# Patient Record
Sex: FEMALE | URBAN - METROPOLITAN AREA
[De-identification: names, ages, dates, MRNs, and addresses within clinical notes are randomized per-mention and may not be internally consistent; named-entity substitution may affect disease eponyms.]

---

## 2020-01-24 ENCOUNTER — TELEPHONE (OUTPATIENT)
Dept: NEUROLOGY | Age: 85
End: 2020-01-24

## 2020-01-24 DIAGNOSIS — F41.8 DEPRESSION WITH ANXIETY: Primary | ICD-10-CM

## 2020-01-24 RX ORDER — AMITRIPTYLINE HYDROCHLORIDE 10 MG/1
10 TABLET, FILM COATED ORAL 2 TIMES DAILY
Qty: 90 TABLET | Refills: 3 | Status: SHIPPED | OUTPATIENT
Start: 2020-01-24

## 2020-02-20 ENCOUNTER — APPOINTMENT (OUTPATIENT)
Dept: GENERAL RADIOLOGY | Facility: HOSPITAL | Age: 85
DRG: 811 | End: 2020-02-20
Attending: EMERGENCY MEDICINE
Payer: MEDICARE

## 2020-02-20 ENCOUNTER — HOSPITAL ENCOUNTER (INPATIENT)
Facility: HOSPITAL | Age: 85
LOS: 4 days | Discharge: NURSING, REHAB OR LONG-TERM CARE | DRG: 811 | End: 2020-02-26
Attending: EMERGENCY MEDICINE | Admitting: INTERNAL MEDICINE
Payer: MEDICARE

## 2020-02-20 DIAGNOSIS — N18.9 CHRONIC KIDNEY DISEASE, UNSPECIFIED CKD STAGE: ICD-10-CM

## 2020-02-20 DIAGNOSIS — D64.9 ANEMIA, UNSPECIFIED TYPE: ICD-10-CM

## 2020-02-20 DIAGNOSIS — I50.9 ACUTE ON CHRONIC CONGESTIVE HEART FAILURE, UNSPECIFIED HEART FAILURE TYPE (HCC): Primary | ICD-10-CM

## 2020-02-20 DIAGNOSIS — I48.20 ATRIAL FIBRILLATION, CHRONIC (HCC): ICD-10-CM

## 2020-02-20 DIAGNOSIS — D50.0 IRON DEFICIENCY ANEMIA SECONDARY TO BLOOD LOSS (CHRONIC): ICD-10-CM

## 2020-02-20 LAB
ALBUMIN SERPL-MCNC: 2.4 G/DL (ref 3.4–5)
ALBUMIN/GLOB SERPL: 0.7 {RATIO} (ref 1–2)
ALP LIVER SERPL-CCNC: 62 U/L (ref 55–142)
ALT SERPL-CCNC: 31 U/L (ref 13–56)
ANION GAP SERPL CALC-SCNC: 5 MMOL/L (ref 0–18)
ANTIBODY SCREEN: NEGATIVE
AST SERPL-CCNC: 48 U/L (ref 15–37)
BASOPHILS # BLD AUTO: 0.02 X10(3) UL (ref 0–0.2)
BASOPHILS NFR BLD AUTO: 0.2 %
BILIRUB SERPL-MCNC: 0.7 MG/DL (ref 0.1–2)
BILIRUB UR QL STRIP.AUTO: NEGATIVE
BUN BLD-MCNC: 51 MG/DL (ref 7–18)
BUN/CREAT SERPL: 26.3 (ref 10–20)
CALCIUM BLD-MCNC: 7.9 MG/DL (ref 8.5–10.1)
CHLORIDE SERPL-SCNC: 105 MMOL/L (ref 98–112)
CO2 SERPL-SCNC: 25 MMOL/L (ref 21–32)
COLOR UR AUTO: YELLOW
CREAT BLD-MCNC: 1.94 MG/DL (ref 0.55–1.02)
DEPRECATED RDW RBC AUTO: 67.3 FL (ref 35.1–46.3)
EOSINOPHIL # BLD AUTO: 0.12 X10(3) UL (ref 0–0.7)
EOSINOPHIL NFR BLD AUTO: 1.4 %
ERYTHROCYTE [DISTWIDTH] IN BLOOD BY AUTOMATED COUNT: 17.7 % (ref 11–15)
GLOBULIN PLAS-MCNC: 3.3 G/DL (ref 2.8–4.4)
GLUCOSE BLD-MCNC: 175 MG/DL (ref 70–99)
GLUCOSE UR STRIP.AUTO-MCNC: NEGATIVE MG/DL
HCT VFR BLD AUTO: 25 % (ref 35–48)
HGB BLD-MCNC: 7.7 G/DL (ref 12–16)
IMM GRANULOCYTES # BLD AUTO: 0.1 X10(3) UL (ref 0–1)
IMM GRANULOCYTES NFR BLD: 1.2 %
LACTATE SERPL-SCNC: 1.1 MMOL/L (ref 0.4–2)
LEUKOCYTE ESTERASE UR QL STRIP.AUTO: NEGATIVE
LYMPHOCYTES # BLD AUTO: 0.63 X10(3) UL (ref 1–4)
LYMPHOCYTES NFR BLD AUTO: 7.6 %
M PROTEIN MFR SERPL ELPH: 5.7 G/DL (ref 6.4–8.2)
MCH RBC QN AUTO: 32.9 PG (ref 26–34)
MCHC RBC AUTO-ENTMCNC: 30.8 G/DL (ref 31–37)
MCV RBC AUTO: 106.8 FL (ref 80–100)
MONOCYTES # BLD AUTO: 0.73 X10(3) UL (ref 0.1–1)
MONOCYTES NFR BLD AUTO: 8.8 %
NEUTROPHILS # BLD AUTO: 6.68 X10 (3) UL (ref 1.5–7.7)
NEUTROPHILS # BLD AUTO: 6.68 X10(3) UL (ref 1.5–7.7)
NEUTROPHILS NFR BLD AUTO: 80.8 %
NITRITE UR QL STRIP.AUTO: NEGATIVE
NT-PROBNP SERPL-MCNC: 7421 PG/ML (ref ?–450)
OSMOLALITY SERPL CALC.SUM OF ELEC: 298 MOSM/KG (ref 275–295)
PH UR STRIP.AUTO: 8 [PH] (ref 4.5–8)
PLATELET # BLD AUTO: 239 10(3)UL (ref 150–450)
POTASSIUM SERPL-SCNC: 4.9 MMOL/L (ref 3.5–5.1)
PROT UR STRIP.AUTO-MCNC: NEGATIVE MG/DL
RBC # BLD AUTO: 2.34 X10(6)UL (ref 3.8–5.3)
RBC UR QL AUTO: NEGATIVE
RH BLOOD TYPE: POSITIVE
SODIUM SERPL-SCNC: 135 MMOL/L (ref 136–145)
SP GR UR STRIP.AUTO: 1.01 (ref 1–1.03)
UROBILINOGEN UR STRIP.AUTO-MCNC: <2 MG/DL
WBC # BLD AUTO: 8.3 X10(3) UL (ref 4–11)

## 2020-02-20 PROCEDURE — 71045 X-RAY EXAM CHEST 1 VIEW: CPT | Performed by: EMERGENCY MEDICINE

## 2020-02-20 PROCEDURE — 99222 1ST HOSP IP/OBS MODERATE 55: CPT | Performed by: INTERNAL MEDICINE

## 2020-02-20 RX ORDER — SIMVASTATIN 40 MG
40 TABLET ORAL NIGHTLY
Status: ON HOLD | COMMUNITY
End: 2020-02-24

## 2020-02-20 RX ORDER — OMEPRAZOLE 20 MG/1
40 CAPSULE, DELAYED RELEASE ORAL
COMMUNITY

## 2020-02-20 RX ORDER — FENOFIBRATE 67 MG/1
67 CAPSULE ORAL
Status: DISCONTINUED | OUTPATIENT
Start: 2020-02-21 | End: 2020-02-26

## 2020-02-20 RX ORDER — AMITRIPTYLINE HYDROCHLORIDE 10 MG/1
20 TABLET, FILM COATED ORAL NIGHTLY
Status: ON HOLD | COMMUNITY
End: 2020-02-21

## 2020-02-20 RX ORDER — DILTIAZEM HYDROCHLORIDE 240 MG/1
240 CAPSULE, EXTENDED RELEASE ORAL DAILY
COMMUNITY

## 2020-02-20 RX ORDER — FUROSEMIDE 10 MG/ML
20 INJECTION INTRAMUSCULAR; INTRAVENOUS ONCE
Status: COMPLETED | OUTPATIENT
Start: 2020-02-20 | End: 2020-02-20

## 2020-02-20 RX ORDER — CRANBERRY FRUIT EXTRACT 200 MG
1 CAPSULE ORAL DAILY
COMMUNITY

## 2020-02-20 RX ORDER — ATORVASTATIN CALCIUM 20 MG/1
20 TABLET, FILM COATED ORAL NIGHTLY
Status: DISCONTINUED | OUTPATIENT
Start: 2020-02-20 | End: 2020-02-26

## 2020-02-20 RX ORDER — DILTIAZEM HYDROCHLORIDE 120 MG/1
240 CAPSULE, EXTENDED RELEASE ORAL DAILY
Status: DISCONTINUED | OUTPATIENT
Start: 2020-02-20 | End: 2020-02-26

## 2020-02-20 RX ORDER — SODIUM CHLORIDE 9 MG/ML
INJECTION, SOLUTION INTRAVENOUS CONTINUOUS
Status: DISCONTINUED | OUTPATIENT
Start: 2020-02-20 | End: 2020-02-21

## 2020-02-20 RX ORDER — LEVOTHYROXINE SODIUM 0.1 MG/1
100 TABLET ORAL
COMMUNITY

## 2020-02-20 RX ORDER — FENOFIBRATE 145 MG/1
160 TABLET, COATED ORAL DAILY
COMMUNITY

## 2020-02-20 RX ORDER — FLECAINIDE ACETATE 100 MG/1
100 TABLET ORAL EVERY 12 HOURS
Status: ON HOLD | COMMUNITY
End: 2020-02-24

## 2020-02-20 RX ORDER — MAGNESIUM OXIDE 400 MG (241.3 MG MAGNESIUM) TABLET
400 TABLET DAILY
COMMUNITY

## 2020-02-20 RX ORDER — GABAPENTIN 300 MG/1
300 CAPSULE ORAL 3 TIMES DAILY
COMMUNITY

## 2020-02-20 RX ORDER — MULTIVITAMIN
1 TABLET ORAL DAILY
COMMUNITY

## 2020-02-20 RX ORDER — GLIPIZIDE 10 MG/1
10 TABLET ORAL
Status: ON HOLD | COMMUNITY
End: 2020-02-21

## 2020-02-21 LAB
ALBUMIN SERPL-MCNC: 2.1 G/DL (ref 3.4–5)
ALBUMIN/GLOB SERPL: 0.7 {RATIO} (ref 1–2)
ALP LIVER SERPL-CCNC: 55 U/L (ref 55–142)
ALT SERPL-CCNC: 27 U/L (ref 13–56)
ANION GAP SERPL CALC-SCNC: 5 MMOL/L (ref 0–18)
AST SERPL-CCNC: 44 U/L (ref 15–37)
ATRIAL RATE: 104 BPM
BASOPHILS # BLD AUTO: 0.05 X10(3) UL (ref 0–0.2)
BASOPHILS NFR BLD AUTO: 0.8 %
BILIRUB SERPL-MCNC: 0.9 MG/DL (ref 0.1–2)
BUN BLD-MCNC: 48 MG/DL (ref 7–18)
BUN/CREAT SERPL: 29.6 (ref 10–20)
CALCIUM BLD-MCNC: 8.4 MG/DL (ref 8.5–10.1)
CHLORIDE SERPL-SCNC: 109 MMOL/L (ref 98–112)
CO2 SERPL-SCNC: 25 MMOL/L (ref 21–32)
CREAT BLD-MCNC: 1.62 MG/DL (ref 0.55–1.02)
DEPRECATED HBV CORE AB SER IA-ACNC: 778.8 NG/ML (ref 18–340)
DEPRECATED RDW RBC AUTO: 72.4 FL (ref 35.1–46.3)
EOSINOPHIL # BLD AUTO: 0.13 X10(3) UL (ref 0–0.7)
EOSINOPHIL NFR BLD AUTO: 2 %
ERYTHROCYTE [DISTWIDTH] IN BLOOD BY AUTOMATED COUNT: 18.2 % (ref 11–15)
EST. AVERAGE GLUCOSE BLD GHB EST-MCNC: 134 MG/DL (ref 68–126)
FOLATE SERPL-MCNC: 9.4 NG/ML (ref 8.7–?)
GLOBULIN PLAS-MCNC: 3.1 G/DL (ref 2.8–4.4)
GLUCOSE BLD-MCNC: 118 MG/DL (ref 70–99)
GLUCOSE BLD-MCNC: 138 MG/DL (ref 70–99)
GLUCOSE BLD-MCNC: 174 MG/DL (ref 70–99)
GLUCOSE BLD-MCNC: 177 MG/DL (ref 70–99)
GLUCOSE BLD-MCNC: 187 MG/DL (ref 70–99)
HAV IGM SER QL: 2 MG/DL (ref 1.6–2.6)
HBA1C MFR BLD HPLC: 6.3 % (ref ?–5.7)
HCT VFR BLD AUTO: 26.1 % (ref 35–48)
HGB BLD-MCNC: 7.9 G/DL (ref 12–16)
HGB RETIC QN AUTO: 35.8 PG (ref 28.2–36.6)
IMM GRANULOCYTES # BLD AUTO: 0.08 X10(3) UL (ref 0–1)
IMM GRANULOCYTES NFR BLD: 1.2 %
IMM RETICS NFR: 0.38 RATIO (ref 0.1–0.3)
IRON SATURATION: 11 % (ref 15–50)
IRON SERPL-MCNC: 31 UG/DL (ref 50–170)
LYMPHOCYTES # BLD AUTO: 0.57 X10(3) UL (ref 1–4)
LYMPHOCYTES NFR BLD AUTO: 8.7 %
M PROTEIN MFR SERPL ELPH: 5.2 G/DL (ref 6.4–8.2)
MCH RBC QN AUTO: 33.6 PG (ref 26–34)
MCHC RBC AUTO-ENTMCNC: 30.3 G/DL (ref 31–37)
MCV RBC AUTO: 111.1 FL (ref 80–100)
MONOCYTES # BLD AUTO: 0.72 X10(3) UL (ref 0.1–1)
MONOCYTES NFR BLD AUTO: 11 %
NEUTROPHILS # BLD AUTO: 5.01 X10 (3) UL (ref 1.5–7.7)
NEUTROPHILS # BLD AUTO: 5.01 X10(3) UL (ref 1.5–7.7)
NEUTROPHILS NFR BLD AUTO: 76.3 %
OSMOLALITY SERPL CALC.SUM OF ELEC: 302 MOSM/KG (ref 275–295)
PLATELET # BLD AUTO: 243 10(3)UL (ref 150–450)
POTASSIUM SERPL-SCNC: 4.7 MMOL/L (ref 3.5–5.1)
Q-T INTERVAL: 290 MS
QRS DURATION: 112 MS
QTC CALCULATION (BEZET): 372 MS
R AXIS: 44 DEGREES
RBC # BLD AUTO: 2.35 X10(6)UL (ref 3.8–5.3)
RETICS # AUTO: 123.2 X10(3) UL (ref 22.5–147.5)
RETICS/RBC NFR AUTO: 5.5 % (ref 0.5–2.5)
SODIUM SERPL-SCNC: 139 MMOL/L (ref 136–145)
T AXIS: 209 DEGREES
T4 FREE SERPL-MCNC: 1 NG/DL (ref 0.8–1.7)
TOTAL IRON BINDING CAPACITY: 285 UG/DL (ref 240–450)
TRANSFERRIN SERPL-MCNC: 191 MG/DL (ref 200–360)
TSI SER-ACNC: 6.8 MIU/ML (ref 0.36–3.74)
VENTRICULAR RATE: 99 BPM
VIT B12 SERPL-MCNC: 870 PG/ML (ref 193–986)
WBC # BLD AUTO: 6.6 X10(3) UL (ref 4–11)

## 2020-02-21 PROCEDURE — 99222 1ST HOSP IP/OBS MODERATE 55: CPT | Performed by: INTERNAL MEDICINE

## 2020-02-21 PROCEDURE — 99232 SBSQ HOSP IP/OBS MODERATE 35: CPT | Performed by: INTERNAL MEDICINE

## 2020-02-21 RX ORDER — ACETAMINOPHEN 325 MG/1
650 TABLET ORAL EVERY 6 HOURS PRN
Status: DISCONTINUED | OUTPATIENT
Start: 2020-02-21 | End: 2020-02-26

## 2020-02-21 RX ORDER — FOLIC ACID 1 MG/1
1 TABLET ORAL DAILY
Status: DISCONTINUED | OUTPATIENT
Start: 2020-02-21 | End: 2020-02-26

## 2020-02-21 RX ORDER — DEXTROSE MONOHYDRATE 25 G/50ML
50 INJECTION, SOLUTION INTRAVENOUS
Status: DISCONTINUED | OUTPATIENT
Start: 2020-02-21 | End: 2020-02-26

## 2020-02-21 RX ORDER — DOXEPIN HYDROCHLORIDE 50 MG/1
1 CAPSULE ORAL DAILY
Status: DISCONTINUED | OUTPATIENT
Start: 2020-02-21 | End: 2020-02-26

## 2020-02-21 RX ORDER — ONDANSETRON 2 MG/ML
4 INJECTION INTRAMUSCULAR; INTRAVENOUS EVERY 6 HOURS PRN
Status: DISCONTINUED | OUTPATIENT
Start: 2020-02-21 | End: 2020-02-26

## 2020-02-21 RX ORDER — HYDRALAZINE HYDROCHLORIDE 20 MG/ML
10 INJECTION INTRAMUSCULAR; INTRAVENOUS EVERY 6 HOURS PRN
Status: DISCONTINUED | OUTPATIENT
Start: 2020-02-21 | End: 2020-02-26

## 2020-02-21 RX ORDER — PANTOPRAZOLE SODIUM 40 MG/1
40 TABLET, DELAYED RELEASE ORAL
Status: DISCONTINUED | OUTPATIENT
Start: 2020-02-21 | End: 2020-02-26

## 2020-02-21 RX ORDER — GABAPENTIN 300 MG/1
300 CAPSULE ORAL 3 TIMES DAILY
Status: DISCONTINUED | OUTPATIENT
Start: 2020-02-21 | End: 2020-02-26

## 2020-02-21 RX ORDER — LEVOTHYROXINE SODIUM 0.1 MG/1
100 TABLET ORAL
Status: DISCONTINUED | OUTPATIENT
Start: 2020-02-21 | End: 2020-02-26

## 2020-02-21 RX ORDER — AMITRIPTYLINE HYDROCHLORIDE 10 MG/1
20 TABLET, FILM COATED ORAL NIGHTLY
Status: DISCONTINUED | OUTPATIENT
Start: 2020-02-21 | End: 2020-02-23

## 2020-02-21 RX ORDER — DILTIAZEM HYDROCHLORIDE 5 MG/ML
10 INJECTION INTRAVENOUS EVERY 2 HOUR PRN
Status: DISCONTINUED | OUTPATIENT
Start: 2020-02-21 | End: 2020-02-26

## 2020-02-21 RX ORDER — DIPHENHYDRAMINE HYDROCHLORIDE 50 MG/ML
25 INJECTION INTRAMUSCULAR; INTRAVENOUS EVERY 6 HOURS PRN
Status: DISCONTINUED | OUTPATIENT
Start: 2020-02-21 | End: 2020-02-26

## 2020-02-21 RX ORDER — GLIPIZIDE 5 MG/1
10 TABLET ORAL
Status: DISCONTINUED | OUTPATIENT
Start: 2020-02-21 | End: 2020-02-21

## 2020-02-21 NOTE — PROGRESS NOTES
· [facility]      Subjective:  Awake, alert no dyspnea.     Objective:  128/60  143/77  AFib 100-120s  Afebrile  UO -2825 overnight  BUN/cr 48/1.62  Hgb 7.9    Neuro: awake/alert  HEENT:  Cardiac: S1 S2 irregular tachy  Lungs:  Few crackles rig

## 2020-02-21 NOTE — CONSULTS
[facility]  Cardiology Consultation    [facility] Patient Status:  Inpatient    1931 MRN [id number]   [facility] 2NE-A Attending Shama Núñez MD   Hosp Day # 0 PCP Ok Cook MD     Reason for Consultation:  Severe anemia on 2/20/2020 2311  Last data filed at 2/20/2020 2020  Gross per 24 hour   Intake —   Output 800 ml   Net -800 ml     Wt Readings from Last 3 Encounters:  02/20/20 : 212 lb 4.9 oz      Physical Exam:   General: Alert and oriented x 3. No apparent distress.  No

## 2020-02-21 NOTE — CM/SW NOTE
Plan of care reviewed for care coordination. Pt is from [address] [facility] in [facility] presented to ER with abnormal labs. Has hx of CAF, Anemia, CKD, and DM per daughter, Maday Osorio, the [address] [facility] who I spoke to confirm.  Daughter is getting discharged f

## 2020-02-21 NOTE — CONSULTS
[facility]  Report of Consultation    Fabio Dozier Patient Status:  Observation    1931 MRN [id number]   [facility] 2NE-A Attending Walter Reyna MD   Hosp Day # 1 PCP Jewell Castro MD     Reason for Consultation:  ANTONIO/afib with RV Subcutaneous, TID CC and HS  •  dilTIAZem HCl (CARDIZEM) injection 10 mg, 10 mg, Intravenous, Q2H PRN  •  metoprolol Tartrate (LOPRESSOR) tab 25 mg, 25 mg, Oral, 2x Daily(Beta Blocker)  •  acetaminophen (TYLENOL) tab 650 mg, 650 mg, Oral, Q6H PRN  •  diphe 6.6 02/21/2020    HGB 7.9 02/21/2020    HCT 26.1 02/21/2020    .0 02/21/2020    CREATSERUM 1.62 02/21/2020    BUN 48 02/21/2020     02/21/2020    K 4.7 02/21/2020     02/21/2020    CO2 25.0 02/21/2020     02/21/2020    CA 8.4 02/2

## 2020-02-21 NOTE — CM/SW NOTE
Patient failed inpatient criteria. Second level of review completed and supports observation. UR committee in agreement. Discussed with Dr. Finesse Pascual  who approves observation status. MOON given to the patient and order written.

## 2020-02-21 NOTE — PLAN OF CARE
Pt received at 0730 resting in bed. A&Ox2-3, knows name, month and year, and hospital but not which one. 3+ pitting edema to BLE. Right arm very swollen, possibly from recent PICC? Which has been removed prior to admission.  Recent venous doppler shows no D effectiveness of vasoactive medications to optimize hemodynamic stability  - Monitor arterial and/or venous puncture sites for bleeding and/or hematoma  - Assess quality of pulses, skin color and temperature  - Assess for signs of decreased coronary artery management of diabetes  Outcome: Progressing  Goal: Electrolytes maintained within normal limits  Description  INTERVENTIONS:  - Monitor labs and rhythm and assess patient for signs and symptoms of electrolyte imbalances  - Administer electrolyte replaceme

## 2020-02-21 NOTE — DIETARY NOTE
[facility]   CLINICAL NUTRITION    Demetria Depa     Admitting diagnosis:  Atrial fibrillation, chronic (HCC) [I48.20]  Anemia, unspecified type [D64.9]  Chronic kidney disease, unspecified CKD stage [N18.9]  Acute on chronic congestive heart failure, unsp

## 2020-02-21 NOTE — PROGRESS NOTES
Tried to wean to room air while sleeping. 02 sat drop to 87%. 2l/nc applied and sat up to 95%. Denies any sob. Cont. to monitor pt.

## 2020-02-21 NOTE — SLP NOTE
ADULT SWALLOWING EVALUATION    ASSESSMENT    ASSESSMENT/OVERALL IMPRESSION:  Patient seen for swallowing evaluation per protocol.   She has a history of dysphagia evidenced by diet consistency order of puree and thin liquids with 1:1 assistance with feeding Electrolyte and fluid disorder    • Encephalopathy    • Essential hypertension    • Hyperlipidemia    • Intervertebral disc disease    • Malignant melanoma ([facility] [address]     SKIN    • Osteoporosis    • Scoliosis    • Weakness        Prior Living Situation: Skilled (Please note: Silent aspiration cannot be evaluated clinically.  Videofluoroscopic Swallow Study is required to rule-out silent aspiration.)    Esophageal Phase of Swallow: No complaints consistent with possible esophageal involvement          FOLLOW UP

## 2020-02-21 NOTE — PROGRESS NOTES
NURSING ADMISSION NOTE      Patient admitted via Cart  Oriented to room. Safety precautions initiated. Bed in low position. Call light in reach. Admission navigator questions including medication reconciliation completed.

## 2020-02-21 NOTE — PHYSICAL THERAPY NOTE
PHYSICAL THERAPY EVALUATION - INPATIENT     Room Number: 6840/1915-W  Evaluation Date: 2/21/2020  Type of Evaluation: Initial  Physician Order: PT Eval and Treat    Presenting Problem: acute on chronic congestive heart failure  Reason for Therapy:  Musa Cohen COGNITION  · Arousal/Alertness:  delayed responses to stimuli  · Orientation Level:  oriented to place, oriented to person and disoriented to time  · Following Commands:  follows one step commands with increased time  · Safety Judgement:  decreased received, chart reviewed and RN approved PT session. Pt lying in bed and agreed to PT. Pt with Max A for supine<>sit to EOB. Pt with difficulty sitting EOB and demonstrates right trunk lean and weakness.  Pt cued for balance and required Mod A for sitting E Sub-acute rehabilitation    PLAN  PT Treatment Plan: Bed mobility; Body mechanics; Endurance; Energy conservation;Patient education; Family education;Strengthening;Transfer training;Balance training  Rehab Potential : Fair  Frequency (Obs): 5x/week  Number of

## 2020-02-21 NOTE — ED NOTES
PER LIANA RN AT [address] [place] [address] PT WAS HOSPITALIZED IN January IN THE ICU FOR A FALL, DURING ADMISSION PT WAS DIAGNOSED WITH A FIB AS WELL AS PACEMAKER PLACEMENT.  PRIOR TO ADMISSION TO [place] PT WAS LIVING AT [address] [place] [address]

## 2020-02-21 NOTE — ED INITIAL ASSESSMENT (HPI)
Pt to ed from [facility] care for c/o low hemaglobin.  PT offers no complaints

## 2020-02-21 NOTE — CARDIAC REHAB
Received referral for heart failure education. Deferred r/t to patient's current cognitive status, and no family present.

## 2020-02-21 NOTE — CONSULTS
[facility]                       Gastroenterology [address] [facility]    Demetria Depa Patient Status:  Observation    1931 MRN [id number]   [facility] 2NE-A Attending Maris Dupont MD   [facility] Day # mg, Oral, QAM AC  glucose (DEX4) oral liquid 15 g, 15 g, Oral, Q15 Min PRN    Or  Glucose-Vitamin C (DEX-4) chewable tab 4 tablet, 4 tablet, Oral, Q15 Min PRN    Or  dextrose 50 % injection 50 mL, 50 mL, Intravenous, Q15 Min PRN    Or  glucose (DEX4) oral s/p PPM (Eliquis)            Respiratory: No shortness of breath, asthma, copd, recurrent pneumonia            Hematologic: The patient reports no easy bruising, frequent gum bleeding or nose bleeding; + chronic anemia            Dermatologic: The patient  02/21/2020    CA 8.4 02/21/2020    ALB 2.1 02/21/2020    ALKPHO 55 02/21/2020    BILT 0.9 02/21/2020    AST 44 02/21/2020    ALT 27 02/21/2020    MG 2.0 02/21/2020     Recent Labs   Lab 02/20/20  1842 02/21/20  0627   * 118*   BUN 51* 48* macrocytic anemia: no overt GI bleeding, no GI symptoms. Will await iron studies and monitor for overt bleeding--had recent admission with Hgb 6 d/t large hematoma at pacer implant site ([facility] admit). 2. AF s/p PPM on Eliquis  3. CKD  4. DM  5.

## 2020-02-21 NOTE — H&P
[facility]    History & Physical    [facility] Patient Status:  Inpatient    1931 MRN [id number]   [facility] [place] 2NE-A Attending Maura Sanders MD   Hosp Day # 1 PCP Mabel Galvan MD     Date:  2020  Date of Admission:   file    Allergies/Medications: Allergies:   Erythromycin            UNKNOWN  omeprazole 20 MG Oral Capsule Delayed Release, Take 40 mg by mouth every morning before breakfast.  simvastatin 40 MG Oral Tab, Take 40 mg by mouth nightly.   Levothyroxine Sodiu breath sound at bases bilaterally, respirations unlabored       Heart:    Regular rate and rhythm, S1 and S2 normal,    Abdomen:     Soft, non-tender, bowel sounds active all four quadrants,                Extremities:    no cyanosis, icterus        Neurol transcribed by Technologist)  Pt to ed from [facility] care for c/o low hemaglobin. PT offers no  complaints     FINDINGS:  Left-sided pacemaker. Cardiomegaly. Interstitial and alveolar opacities bilaterally.   Bilateral pleural effusions, left gre unspecified CKD stage  Because of acute on chronic kidney failure, diuretics on hold, renal follow-up    IDDM with complication–hold glipizide due to renal failure, start Levemir 8 units and NovoLog sliding scale    GERD–pantoprazole    DVT prophylaxis–Tran

## 2020-02-21 NOTE — OCCUPATIONAL THERAPY NOTE
OCCUPATIONAL THERAPY EVALUATION - INPATIENT     Room Number: 1388/2376-Q  Evaluation Date: 2/21/2020  Type of Evaluation: Initial  Presenting Problem: Anemia    Physician Order: IP Consult to Occupational Therapy  Reason for Therapy: ADL/IADL Dysfunction a Function: Pt reports assist for LB dressing and bathing, MOD I for UB dressing, assist of one for t/f. Reports ambulates with RW, gets meals brought to her room. SUBJECTIVE   \"Oh I was sleeping. \"    Patient self-stated goal is not stated     OBJECTIVE Scale): 30.6  CMS Modifier (G-Code): CL    FUNCTIONAL TRANSFER ASSESSMENT  Supine to Sit : Maximum assistance  Sit to Stand: Maximum assistance(max A x 2)    Skilled Therapy Provided: Pt received supine in bed.  Pt oreinted to self, disoriented to place and occupational profile, problem-focused assessments, limited treatment options    Overall Complexity LOW     OT Discharge Recommendations: Home with home health PT/OT(pt is from assisted living)  OT Device Recommendations: TBD    PLAN  OT Treatment Plan: Brigid Rosenthal

## 2020-02-21 NOTE — ED PROVIDER NOTES
Patient Seen in: [facility] Emergency Department      History   Patient presents with:  Abnormal Labs    Stated Complaint: Low Hemoglobin    HPI    31-year-old female with history of chronic atrial fibrillation, chronic kidney disease, anemia, prese scleral icterus. Mucous membranes are moist, oropharynx is clear, uvula midline. Scalp is atraumatic. NECK: Neck is supple, there is no nuchal rigidity. HEART: Regular rate and rhythm, no murmurs. LUNGS: Coarse breath sounds bilaterally. , no wheezin (*)     MCHC 30.8 (*)     RDW 17.7 (*)     RDW-SD 67.3 (*)     Lymphocyte Absolute 0.63 (*)     All other components within normal limits   LACTIC ACID, PLASMA - Normal   CBC WITH DIFFERENTIAL WITH PLATELET    Narrative:      The following orders were creat physician on call for Kely Rosa. Patient admitted for further evaluation treatment.   Admission disposition: 2/20/2020  8:24 PM                   Disposition and Plan     Clinical Impression:  Acute on chronic congestive heart failure, unspecified heart venita

## 2020-02-21 NOTE — PLAN OF CARE
Received pt from er. Admitted d/t abnormal lab- hgb 7.7. alert. Pleasantly confused. Oriented to person. No family at bedside. Pt came to er w/ a vu cath that was inserted @ the [facility]. Generalized edema- RUE, BLE. Mult anitha noted. Denies cp, sob.  Poc dis

## 2020-02-21 NOTE — CONSULTS
THE [facility] OF [facility] Report of Consultation      Patient Name: Zully Uriostegui   YOB: 1931  Medical Record Number: [id number]  Consulting Physician: Mae Johnson. Christiano James M.D.    Referring Physician: Walter Reyna MD    Date of Consultatio Medications  Amitriptyline HCl (ELAVIL) tab 20 mg, 20 mg, Oral, Nightly  gabapentin (NEURONTIN) cap 300 mg, 300 mg, Oral, TID  Levothyroxine Sodium (SYNTHROID) tab 100 mcg, 100 mcg, Oral, Before breakfast  multivitamin (ADULT) tab 1 tablet, 1 tablet, Oral, or enlarged lymph nodes. Respiratory No dyspnea at rest.  Cardiovascular No anginal chest pain, palpitations, edema, orthopnea. Gastrointestinal No melena, hematochezia. Genitourinary No hematuria, no vaginal bleeding.   Musculoskeletal Right arm swellin 295 mOsm/kg    GFR, Non- 23 (L) >=60    GFR, -American 26 (L) >=60    AST 48 (H) 15 - 37 U/L    ALT 31 13 - 56 U/L    Alkaline Phosphatase 62 55 - 142 U/L    Bilirubin, Total 0.7 0.1 - 2.0 mg/dL    Total Protein 5.7 (L) 6.4 - 8.2 g/d Result Value Ref Range    RBC Morphology See morphology below (A) Normal, Slide reviewed, see previous RBC morphology.     Platelet Morphology See morphology below (A) Normal    Microcytosis 1+      Macrocytosis 2+ (A)      Giant platelets Few (A)     LAC (L) 6.4 - 8.2 g/dL    Albumin 2.1 (L) 3.4 - 5.0 g/dL    Globulin  3.1 2.8 - 4.4 g/dL    A/G Ratio 0.7 (L) 1.0 - 2.0   MAGNESIUM    Collection Time: 02/21/20  6:27 AM   Result Value Ref Range    Magnesium 2.0 1.6 - 2.6 mg/dL   HEMOGLOBIN A1C    Collection T pneumothorax. Atheromatous calcifications of the   aorta. Thin linear radiopaque report densities identified within both lungs      =====  CONCLUSION:    1. Cardiomegaly with interstitial alveolar opacities bilaterally likely representing edema.   [address] [facility]

## 2020-02-22 LAB
ALBUMIN SERPL-MCNC: 2.3 G/DL (ref 3.4–5)
ALBUMIN/GLOB SERPL: 0.8 {RATIO} (ref 1–2)
ALP LIVER SERPL-CCNC: 57 U/L (ref 55–142)
ALT SERPL-CCNC: 23 U/L (ref 13–56)
ANION GAP SERPL CALC-SCNC: 5 MMOL/L (ref 0–18)
AST SERPL-CCNC: 39 U/L (ref 15–37)
BASOPHILS # BLD AUTO: 0.04 X10(3) UL (ref 0–0.2)
BASOPHILS NFR BLD AUTO: 0.4 %
BILIRUB SERPL-MCNC: 1.1 MG/DL (ref 0.1–2)
BUN BLD-MCNC: 40 MG/DL (ref 7–18)
BUN/CREAT SERPL: 27.6 (ref 10–20)
CALCIUM BLD-MCNC: 8.6 MG/DL (ref 8.5–10.1)
CHLORIDE SERPL-SCNC: 109 MMOL/L (ref 98–112)
CO2 SERPL-SCNC: 26 MMOL/L (ref 21–32)
CREAT BLD-MCNC: 1.45 MG/DL (ref 0.55–1.02)
DEPRECATED RDW RBC AUTO: 74.4 FL (ref 35.1–46.3)
EOSINOPHIL # BLD AUTO: 0.17 X10(3) UL (ref 0–0.7)
EOSINOPHIL NFR BLD AUTO: 1.9 %
ERYTHROCYTE [DISTWIDTH] IN BLOOD BY AUTOMATED COUNT: 18.7 % (ref 11–15)
GLOBULIN PLAS-MCNC: 3 G/DL (ref 2.8–4.4)
GLUCOSE BLD-MCNC: 128 MG/DL (ref 70–99)
GLUCOSE BLD-MCNC: 148 MG/DL (ref 70–99)
GLUCOSE BLD-MCNC: 162 MG/DL (ref 70–99)
GLUCOSE BLD-MCNC: 75 MG/DL (ref 70–99)
GLUCOSE BLD-MCNC: 75 MG/DL (ref 70–99)
HCT VFR BLD AUTO: 26.9 % (ref 35–48)
HGB BLD-MCNC: 8.3 G/DL (ref 12–16)
IMM GRANULOCYTES # BLD AUTO: 0.12 X10(3) UL (ref 0–1)
IMM GRANULOCYTES NFR BLD: 1.3 %
LYMPHOCYTES # BLD AUTO: 0.98 X10(3) UL (ref 1–4)
LYMPHOCYTES NFR BLD AUTO: 10.7 %
M PROTEIN MFR SERPL ELPH: 5.3 G/DL (ref 6.4–8.2)
MCH RBC QN AUTO: 33.7 PG (ref 26–34)
MCHC RBC AUTO-ENTMCNC: 30.9 G/DL (ref 31–37)
MCV RBC AUTO: 109.3 FL (ref 80–100)
MONOCYTES # BLD AUTO: 1.02 X10(3) UL (ref 0.1–1)
MONOCYTES NFR BLD AUTO: 11.1 %
NEUTROPHILS # BLD AUTO: 6.85 X10 (3) UL (ref 1.5–7.7)
NEUTROPHILS # BLD AUTO: 6.85 X10(3) UL (ref 1.5–7.7)
NEUTROPHILS NFR BLD AUTO: 74.6 %
OSMOLALITY SERPL CALC.SUM OF ELEC: 298 MOSM/KG (ref 275–295)
PLATELET # BLD AUTO: 292 10(3)UL (ref 150–450)
POTASSIUM SERPL-SCNC: 4.1 MMOL/L (ref 3.5–5.1)
RBC # BLD AUTO: 2.46 X10(6)UL (ref 3.8–5.3)
SODIUM SERPL-SCNC: 140 MMOL/L (ref 136–145)
WBC # BLD AUTO: 9.2 X10(3) UL (ref 4–11)

## 2020-02-22 PROCEDURE — 99232 SBSQ HOSP IP/OBS MODERATE 35: CPT | Performed by: INTERNAL MEDICINE

## 2020-02-22 RX ORDER — METOPROLOL TARTRATE 50 MG/1
50 TABLET, FILM COATED ORAL
Status: DISCONTINUED | OUTPATIENT
Start: 2020-02-22 | End: 2020-02-26

## 2020-02-22 NOTE — PROGRESS NOTES
[facility]  Progress Note    Sujit Sneed Patient Status:  Observation    1931 MRN [id number]   [facility] 2NE-A Attending Ernie Padilla MD   Hosp Day # 1 PCP Lencho Freeman MD         SUBJECTIVE:  Subjective:   Sujit Sneed is a(n) 80 y 4.1    109 109   CO2 25.0 25.0 26.0   BUN 51* 48* 40*   CREATSERUM 1.94* 1.62* 1.45*   CA 7.9* 8.4* 8.6   MG  --  2.0  --    * 118* 75       Recent Labs   Lab 02/20/20  1842 02/21/20  0627 02/22/20  0616   ALT 31 27 23   AST 48* 44* 39*   ALB mg Oral TID   • Levothyroxine Sodium  100 mcg Oral Before breakfast   • multivitamin  1 tablet Oral Daily   • Pantoprazole Sodium  40 mg Oral QAM AC   • Insulin Aspart Pen  1-5 Units Subcutaneous TID CC and HS   • insulin detemir  8 Units Subcutaneous [facility] disease, unspecified CKD stage  Because of acute on chronic kidney failure, diuretics on hold, renal follow-up     IDDM with complication–hold glipizide due to renal failure, start Levemir 8 units and NovoLog sliding scale     GERD–pantoprazole     DVT pro

## 2020-02-22 NOTE — PROGRESS NOTES
[facility]  Nephrology Progress Note    Brian Grace Patient Status:  Observation    1931 MRN [id number]   [facility] 2NE-A Attending Fernando Langley MD   Hosp Day # 1 PCP Juancho Khoury MD       SUBJECTIVE:  Up in chair, awake and al (LOPRESSOR) tab 50 mg, 50 mg, Oral, 2x Daily(Beta Blocker)  Amitriptyline HCl (ELAVIL) tab 20 mg, 20 mg, Oral, Nightly  gabapentin (NEURONTIN) cap 300 mg, 300 mg, Oral, TID  Levothyroxine Sodium (SYNTHROID) tab 100 mcg, 100 mcg, Oral, Before breakfast  mul approx 2 grams. W/u ongoing     #3. R arm edema- apparently has been present as she had u/s on 2/12 with no DVT noted. Old records have been requested     #4. HTN- BP stable, cont current med regimen     #5.  afib- rate controlled.   Cards following

## 2020-02-22 NOTE — PLAN OF CARE
Assumed care of pt @2330. Pt axox1, pleasently confused. Denies chest pain or sob. A-fib on tele HR 90's-100's. Goal is to keep resting HR <110. Non pitting edema to ble. Scds on. ALEXIA swollen. Lungs diminished. O2 sat on 2lnc >90%. Abdomen soft, bs +4.  Rene Epp indicated  - Evaluate effectiveness of antiarrhythmic and heart rate control medications as ordered  - Initiate emergency measures for life threatening arrhythmias  - Monitor electrolytes and administer replacement therapy as ordered  Outcome: Progressing available)  - Encourage oral intake as appropriate  - Instruct patient on fluid and nutrition restrictions as appropriate  Outcome: Progressing     Problem: Impaired Swallowing  Goal: Minimize aspiration risk  Description  Interventions:  - Patient should

## 2020-02-22 NOTE — PROGRESS NOTES
· [facility]      Subjective:  Awake but confused    Objective:  129/69  AFib 90s up to120  Afebrile I/O   UO - 580  BUN/cr 40/1.45  Hgb 8.3    Neuro: awake confused  HEENT: no JVD  Cardiac: S1 S2 irregular tachy  Lungs:  clear  Abdomen: soft

## 2020-02-22 NOTE — PHYSICAL THERAPY NOTE
PHYSICAL THERAPY TREATMENT NOTE - INPATIENT    Room Number: 4615/2223-X     Session: 1   Number of Visits to Meet Established Goals: 5    Presenting Problem: Acute on Chronic CHF  History related to current admission:  Pt admitted on 2/20/20 secondary to blankets)?: A Lot   -   Sitting down on and standing up from a chair with arms (e.g., wheelchair, bedside commode, etc.): A Lot   -   Moving from lying on back to sitting on the side of the bed?: A Lot   How much help from another person does the patient c rehabilitation     PLAN  PT Treatment Plan: Bed mobility; Body mechanics; Endurance; Energy conservation;Patient education; Family education;Strengthening;Transfer training;Balance training  Rehab Potential : Fair  Frequency (Obs): 5x/week  CURRENT GOALS     G

## 2020-02-22 NOTE — PLAN OF CARE
Alert to self, 95% on 2L, VSS, no complaints of chest pain or SOB, max 2 assist, afib on tele, IV iron, HR 90's, vu intact draining clear yellow urine, PT to see, swelling right arm, plan for neuro consult re: increase confusion, safety precautions in p stability  Description  INTERVENTIONS:  - Monitor vital signs, rhythm, and trends  - Monitor for bleeding, hypotension and signs of decreased cardiac output  - Evaluate effectiveness of vasoactive medications to optimize hemodynamic stability  - Monitor ar medications to maintain glucose within target range  - Assess barriers to adequate nutritional intake and initiate nutrition consult as needed  - Instruct patient on self management of diabetes  Outcome: Progressing  Goal: Electrolytes maintained within no Promote increasing activity/tolerance for mobility and gait  - Educate and engage patient/family in tolerated activity level and precautions  -2 person assist with use of RW for transfers.     Outcome: Progressing     Problem: Impaired Activities of Daily L

## 2020-02-23 ENCOUNTER — APPOINTMENT (OUTPATIENT)
Dept: CT IMAGING | Facility: HOSPITAL | Age: 85
DRG: 811 | End: 2020-02-23
Attending: Other
Payer: MEDICARE

## 2020-02-23 PROBLEM — G93.40 ENCEPHALOPATHY: Status: ACTIVE | Noted: 2020-02-23

## 2020-02-23 LAB
ANION GAP SERPL CALC-SCNC: 3 MMOL/L (ref 0–18)
BASOPHILS # BLD AUTO: 0.04 X10(3) UL (ref 0–0.2)
BASOPHILS NFR BLD AUTO: 0.6 %
BUN BLD-MCNC: 36 MG/DL (ref 7–18)
BUN/CREAT SERPL: 27.9 (ref 10–20)
CALCIUM BLD-MCNC: 8.4 MG/DL (ref 8.5–10.1)
CHLORIDE SERPL-SCNC: 109 MMOL/L (ref 98–112)
CO2 SERPL-SCNC: 30 MMOL/L (ref 21–32)
CREAT BLD-MCNC: 1.29 MG/DL (ref 0.55–1.02)
DEPRECATED RDW RBC AUTO: 74.4 FL (ref 35.1–46.3)
EOSINOPHIL # BLD AUTO: 0.15 X10(3) UL (ref 0–0.7)
EOSINOPHIL NFR BLD AUTO: 2.1 %
ERYTHROCYTE [DISTWIDTH] IN BLOOD BY AUTOMATED COUNT: 18.9 % (ref 11–15)
GLUCOSE BLD-MCNC: 149 MG/DL (ref 70–99)
GLUCOSE BLD-MCNC: 158 MG/DL (ref 70–99)
GLUCOSE BLD-MCNC: 176 MG/DL (ref 70–99)
GLUCOSE BLD-MCNC: 84 MG/DL (ref 70–99)
GLUCOSE BLD-MCNC: 93 MG/DL (ref 70–99)
HCT VFR BLD AUTO: 26.3 % (ref 35–48)
HGB BLD-MCNC: 7.9 G/DL (ref 12–16)
IMM GRANULOCYTES # BLD AUTO: 0.12 X10(3) UL (ref 0–1)
IMM GRANULOCYTES NFR BLD: 1.7 %
LYMPHOCYTES # BLD AUTO: 0.74 X10(3) UL (ref 1–4)
LYMPHOCYTES NFR BLD AUTO: 10.2 %
MCH RBC QN AUTO: 32.8 PG (ref 26–34)
MCHC RBC AUTO-ENTMCNC: 30 G/DL (ref 31–37)
MCV RBC AUTO: 109.1 FL (ref 80–100)
MONOCYTES # BLD AUTO: 0.84 X10(3) UL (ref 0.1–1)
MONOCYTES NFR BLD AUTO: 11.6 %
NEUTROPHILS # BLD AUTO: 5.34 X10 (3) UL (ref 1.5–7.7)
NEUTROPHILS # BLD AUTO: 5.34 X10(3) UL (ref 1.5–7.7)
NEUTROPHILS NFR BLD AUTO: 73.8 %
OSMOLALITY SERPL CALC.SUM OF ELEC: 302 MOSM/KG (ref 275–295)
PLATELET # BLD AUTO: 269 10(3)UL (ref 150–450)
POTASSIUM SERPL-SCNC: 3.7 MMOL/L (ref 3.5–5.1)
RBC # BLD AUTO: 2.41 X10(6)UL (ref 3.8–5.3)
SODIUM SERPL-SCNC: 142 MMOL/L (ref 136–145)
WBC # BLD AUTO: 7.2 X10(3) UL (ref 4–11)

## 2020-02-23 PROCEDURE — 99232 SBSQ HOSP IP/OBS MODERATE 35: CPT | Performed by: INTERNAL MEDICINE

## 2020-02-23 PROCEDURE — 70450 CT HEAD/BRAIN W/O DYE: CPT | Performed by: OTHER

## 2020-02-23 PROCEDURE — 99232 SBSQ HOSP IP/OBS MODERATE 35: CPT | Performed by: SPECIALIST

## 2020-02-23 PROCEDURE — 99223 1ST HOSP IP/OBS HIGH 75: CPT | Performed by: OTHER

## 2020-02-23 RX ORDER — AMITRIPTYLINE HYDROCHLORIDE 10 MG/1
20 TABLET, FILM COATED ORAL NIGHTLY
Status: DISCONTINUED | OUTPATIENT
Start: 2020-02-23 | End: 2020-02-26

## 2020-02-23 NOTE — PROGRESS NOTES
· [facility]      Subjective:  Awake still confused    Objective:  111/55  AFib 90-100s  Afebrile   I/O incomplete  BUN/cr 36/1.29  Hgb 7.9    Neuro: awake confused  HEENT: dry mucosa  Cardiac: S1 S2 irregular   Lungs:  Clear anterior  Abdomen

## 2020-02-23 NOTE — PLAN OF CARE
ALERT BUT CONFUSED ON TELE MONITOR HR 90'S AFIB. DENIES ANY PAIN. AGUILAR CATH DRAINING YELLOW URINE. ALL NEEDS ATTENDED AND WILL CONTINUE TO MONITOR. CALL LIGHT WITHIN REACH.   Problem: Diabetes/Glucose Control  Goal: Glucose maintained within prescribed ran and temperature  - Assess for signs of decreased coronary artery perfusion - ex.  Angina  - Evaluate fluid balance, assess for edema, trend weights  2/22/2020 2220 by Judge Landaverde RN  Outcome: Progressing  2/22/2020 2220 by Sulma Munoz medications to maintain glucose within target range  - Assess barriers to adequate nutritional intake and initiate nutrition consult as needed  - Instruct patient on self management of diabetes  2/22/2020 2220 by Pearl Ji RN  Outcome: Pro applesauce as needed  - Discontinue feeding and notify MD (or speech pathologist) if coughing or persistent throat clearing or wet/gurgly vocal quality is noted   2/22/2020 2220 by Dennis Zeng RN  Outcome: Progressing  2/22/2020 2220 by Robert Cavazos

## 2020-02-23 NOTE — PROGRESS NOTES
[facility]  Nephrology Progress Note    Dempsey Mohs Patient Status:  Observation    1931 MRN [id number]   [facility] 2NE-A Attending Aishwarya Walton MD   Hosp Day # 1 PCP Adam Retana MD       SUBJECTIVE:  No acute events      Phys 02/22/20  1225 02/22/20  1753 02/22/20 2042 02/23/20  0723   PGLU 75 128* 148* 162* 93       Meds:   Metoprolol Tartrate (LOPRESSOR) tab 50 mg, 50 mg, Oral, 2x Daily(Beta Blocker)  Amitriptyline HCl (ELAVIL) tab 20 mg, 20 mg, Oral, Nightly  gabapentin (NE normal     #2. Anemia- hgb was 10 on 2/14 and was down approx 2g on admit.  eval ongoing    #3. R arm edema- apparently has been present as she had u/s on 2/12 with no DVT noted. Old records have been requested     #4.   HTN- BP stable, cont current med

## 2020-02-23 NOTE — PLAN OF CARE
Patient alert to self, but confused at times. Afib on telemetry. HR 80's-90's. O2 2L. Sats 90s. BM x2. Cronin from another facility draining clear yellow. Pills with applesauce. Up X 2 to chair and bedside commode.   Plan of care discussed with daughter strengthening/mobility  - Encourage toileting schedule  2/23/2020 1143 by Anthony Jerez RN  Outcome: Progressing  2/23/2020 1142 by Anthony Jerez RN  Outcome: Progressing     Problem: CARDIOVASCULAR - ADULT  Goal: Maintains optimal cardiac ou suctioning and perform as needed  - Assess and instruct to report SOB or any respiratory difficulty  - Respiratory Therapy support as indicated  - Manage/alleviate anxiety  - Monitor for signs/symptoms of CO2 retention  2/23/2020 1143 by Edelmira Hall clearing or wet/gurgly vocal quality is noted   2/23/2020 1143 by Emi Hernandez RN  Outcome: Progressing  2/23/2020 1142 by Emi Hernandez RN  Outcome: Progressing     Problem: Impaired Functional Mobility  Goal: Achieve highest/safest level o

## 2020-02-23 NOTE — CONSULTS
[facility]    Report of Consultation    Alexis Kovacs Patient Status:  Inpatient    1931 MRN [id number]   [facility] 2NE-A Attending Valeria Rosa MD   Hosp Day # 1 PCP Glen Alvarado MD     Date of Admission:  2020  Atrial fi mcg, 100 mcg, Oral, Before breakfast  •  multivitamin (ADULT) tab 1 tablet, 1 tablet, Oral, Daily  •  Pantoprazole Sodium (PROTONIX) EC tab 40 mg, 40 mg, Oral, QAM AC  •  glucose (DEX4) oral liquid 15 g, 15 g, Oral, Q15 Min PRN **OR** Glucose-Vitamin C (DE Fluent mild dysarthric speech and impaired comprehension. Face is symmetrical.  No   Pupils equally round and reactive to light. 3+ brisk bilaterally. EOMs intact. Tongue is midline. The rest of the cranial nerves are grossly intact.   Sensation to lig

## 2020-02-23 NOTE — PROGRESS NOTES
[facility] Progress Note      Patient Name: Vidhi Bingham   YOB: 1931  Medical Record Number: [id number]    Bree Kovacs is a 80year old female sent to ED from [facility] for abnormal laboratory studies.  Hematology consulte UNIT/ML flexpen 1-5 Units, 1-5 Units, Subcutaneous, TID CC and HS  dilTIAZem HCl (CARDIZEM) injection 10 mg, 10 mg, Intravenous, Q2H PRN  acetaminophen (TYLENOL) tab 650 mg, 650 mg, Oral, Q6H PRN  diphenhydrAMINE HCl (BENADRYL) injection 25 mg, 25 mg, Intr POCT GLUCOSE    Collection Time: 02/22/20  8:42 PM   Result Value Ref Range    POC Glucose 162 (H) 70 - 99 mg/dL   BASIC METABOLIC PANEL (8)    Collection Time: 02/23/20  6:46 AM   Result Value Ref Range    Glucose 84 70 - 99 mg/dL    Sodium 142 136 - 14 stable during admission. She will receive last planned IV iron today. Given other medical issues, do not recommend bone marrow biopsy as she is not a candidate for anticancer or anti-MDS therapy. As outpatient could consider BETZY.     Electronically signed b

## 2020-02-23 NOTE — PROGRESS NOTES
[facility]  Progress Note    Arpit Harper Patient Status:  Observation    1931 MRN [id number]   [facility] 2NE-A Attending Chung Michaels MD   Hosp Day # 1 PCP Delia Baker MD         SUBJECTIVE:  Subjective:   Arpit Harper is a(n) 80 y Lab 02/20/20  1842 02/21/20  0627 02/22/20  0616   ALT 31 27 23   AST 48* 44* 39*   ALB 2.4* 2.1* 2.3*       Recent Labs   Lab 02/22/20  0831 02/22/20  1225 02/22/20  1753 02/22/20  2042 02/23/20  0723   PGLU 75 128* 148* 162* 93       No results for inp Pen  1-5 Units Subcutaneous TID CC and HS   • insulin detemir  8 Units Subcutaneous Nightly   • folic acid  1 mg Oral Daily   • dilTIAZem HCl ER  240 mg Oral Daily   • fenofibrate micronized  67 mg Oral Daily with breakfast   • atorvastatin  20 mg Oral Nig to be continued  Overall–as per hematology oncology and cardiology–keep off Eliquis.   Agree with that  Keep off flecainide might  Lopressor and Cardizem for heart rate control        Chronic kidney disease, unspecified CKD stage  Because of acute on chroni

## 2020-02-24 LAB
ANION GAP SERPL CALC-SCNC: 3 MMOL/L (ref 0–18)
BASOPHILS # BLD AUTO: 0.04 X10(3) UL (ref 0–0.2)
BASOPHILS NFR BLD AUTO: 0.6 %
BUN BLD-MCNC: 35 MG/DL (ref 7–18)
BUN/CREAT SERPL: 25.7 (ref 10–20)
CALCIUM BLD-MCNC: 8.2 MG/DL (ref 8.5–10.1)
CHLORIDE SERPL-SCNC: 109 MMOL/L (ref 98–112)
CO2 SERPL-SCNC: 29 MMOL/L (ref 21–32)
CREAT BLD-MCNC: 1.36 MG/DL (ref 0.55–1.02)
DEPRECATED RDW RBC AUTO: 74.8 FL (ref 35.1–46.3)
EOSINOPHIL # BLD AUTO: 0.14 X10(3) UL (ref 0–0.7)
EOSINOPHIL NFR BLD AUTO: 2.2 %
ERYTHROCYTE [DISTWIDTH] IN BLOOD BY AUTOMATED COUNT: 18.6 % (ref 11–15)
GLUCOSE BLD-MCNC: 136 MG/DL (ref 70–99)
GLUCOSE BLD-MCNC: 144 MG/DL (ref 70–99)
GLUCOSE BLD-MCNC: 164 MG/DL (ref 70–99)
GLUCOSE BLD-MCNC: 179 MG/DL (ref 70–99)
GLUCOSE BLD-MCNC: 181 MG/DL (ref 70–99)
HCT VFR BLD AUTO: 27.1 % (ref 35–48)
HGB BLD-MCNC: 8 G/DL (ref 12–16)
IMM GRANULOCYTES # BLD AUTO: 0.13 X10(3) UL (ref 0–1)
IMM GRANULOCYTES NFR BLD: 2 %
LYMPHOCYTES # BLD AUTO: 0.72 X10(3) UL (ref 1–4)
LYMPHOCYTES NFR BLD AUTO: 11.1 %
MCH RBC QN AUTO: 32.7 PG (ref 26–34)
MCHC RBC AUTO-ENTMCNC: 29.5 G/DL (ref 31–37)
MCV RBC AUTO: 110.6 FL (ref 80–100)
MONOCYTES # BLD AUTO: 0.72 X10(3) UL (ref 0.1–1)
MONOCYTES NFR BLD AUTO: 11.1 %
NEUTROPHILS # BLD AUTO: 4.73 X10 (3) UL (ref 1.5–7.7)
NEUTROPHILS # BLD AUTO: 4.73 X10(3) UL (ref 1.5–7.7)
NEUTROPHILS NFR BLD AUTO: 73 %
OSMOLALITY SERPL CALC.SUM OF ELEC: 302 MOSM/KG (ref 275–295)
PLATELET # BLD AUTO: 273 10(3)UL (ref 150–450)
POTASSIUM SERPL-SCNC: 3.9 MMOL/L (ref 3.5–5.1)
RBC # BLD AUTO: 2.45 X10(6)UL (ref 3.8–5.3)
SODIUM SERPL-SCNC: 141 MMOL/L (ref 136–145)
WBC # BLD AUTO: 6.5 X10(3) UL (ref 4–11)

## 2020-02-24 PROCEDURE — 99231 SBSQ HOSP IP/OBS SF/LOW 25: CPT | Performed by: SPECIALIST

## 2020-02-24 PROCEDURE — 99232 SBSQ HOSP IP/OBS MODERATE 35: CPT | Performed by: INTERNAL MEDICINE

## 2020-02-24 PROCEDURE — 99233 SBSQ HOSP IP/OBS HIGH 50: CPT | Performed by: OTHER

## 2020-02-24 PROCEDURE — 95816 EEG AWAKE AND DROWSY: CPT | Performed by: OTHER

## 2020-02-24 RX ORDER — METOPROLOL TARTRATE 50 MG/1
50 TABLET, FILM COATED ORAL
Qty: 30 TABLET | Refills: 0 | Status: SHIPPED | OUTPATIENT
Start: 2020-02-24

## 2020-02-24 RX ORDER — FOLIC ACID 1 MG/1
1 TABLET ORAL DAILY
Qty: 30 TABLET | Refills: 0 | Status: SHIPPED | OUTPATIENT
Start: 2020-02-25

## 2020-02-24 RX ORDER — AMITRIPTYLINE HYDROCHLORIDE 10 MG/1
20 TABLET, FILM COATED ORAL NIGHTLY
Qty: 30 TABLET | Refills: 0 | Status: SHIPPED | OUTPATIENT
Start: 2020-02-24

## 2020-02-24 RX ORDER — ATORVASTATIN CALCIUM 20 MG/1
20 TABLET, FILM COATED ORAL NIGHTLY
Qty: 30 TABLET | Refills: 0 | Status: SHIPPED | OUTPATIENT
Start: 2020-02-24

## 2020-02-24 NOTE — PHYSICAL THERAPY NOTE
PHYSICAL THERAPY TREATMENT NOTE - INPATIENT    Room Number: 2639/7924-X     Session: 1     Number of Visits to Meet Established Goals: 5    Presenting Problem: Acute on Chronic CHF    History related to current admission:  Pt admitted on 2/20/20 seconda Standing: Fair -  Dynamic Standing: Poor +    ACTIVITY TOLERANCE           BP: 135/84  BP Location: Left arm  BP Method: Automatic  Patient Position: Sitting    O2 WALK                    AM-PAC '6-Clicks' INPATIENT SHORT FORM - BASIC MOBILITY  How much di rest due to pt c/o nausea, but then pt c/o wanting cream on her bottom for the discomfort. Pt motivated to sit up and performed a partial sit up with supervision and able to shift weight in the b/s chair to the side with supervision.  Sit to stand again as mechanics; Endurance; Energy conservation;Patient education; Family education;Strengthening;Transfer training;Balance training  Rehab Potential : Fair  Frequency (Obs): 5x/week    CURRENT GOALS     Goal #1 Patient is able to demonstrate supine - sit EOB @ lev

## 2020-02-24 NOTE — PROGRESS NOTES
Problem: Diabetes/Glucose Control  Goal: Glucose maintained within prescribed range  Description  INTERVENTIONS:  - Monitor Blood Glucose as ordered  - Assess for signs and symptoms of hyperglycemia and hypoglycemia  - Administer ordered medications to [facility] medications as ordered  - Initiate emergency measures for life threatening arrhythmias  - Monitor electrolytes and administer replacement therapy as ordered  Outcome: Progressing     Problem: RESPIRATORY - ADULT  Goal: Achieves optimal ventilation and oxyg Monitor labs and assess for signs and symptoms of volume excess or deficit  - Monitor intake, output and patient weight  - Monitor urine specific gravity, serum osmolarity and serum sodium as indicated or ordered  - Monitor response to interventions for pa today.  MRI if pacemaker is compatible. Will continue POC.

## 2020-02-24 NOTE — PROGRESS NOTES
[facility] Progress Note      Patient Name: Vidhi Bingham   YOB: 1931  Medical Record Number: [id number]    Bree Kovacs is a 80year old female sent to ED from [facility] [place], operated by [facility] for abnormal laboratory studies.  Hematology consulte mg, Oral, Daily  fenofibrate micronized (LOFIBRA) cap 67 mg, 67 mg, Oral, Daily with breakfast  atorvastatin (LIPITOR) tab 20 mg, 20 mg, Oral, Nightly      Vital Signs   /48 (BP Location: Left arm)   Pulse 75   Temp 97.9 °F (36.6 °C) (Oral)   Resp 18 80.0 - 100.0 fL    MCH 32.7 26.0 - 34.0 pg    MCHC 29.5 (L) 31.0 - 37.0 g/dL    RDW 18.6 (H) 11.0 - 15.0 %    RDW-SD 74.8 (H) 35.1 - 46.3 fL    Neutrophil Absolute Prelim 4.73 1.50 - 7.70 x10 (3) uL    Neutrophil Absolute 4.73 1.50 - 7.70 x10(3) uL    Lymp

## 2020-02-24 NOTE — PROGRESS NOTES
[facility]  Nephrology Progress Note    Mellisa Espino Patient Status:  Observation    1931 MRN [id number]   [facility] 2NE-A Attending Sanaz Contreras MD   Hosp Day # 2 PCP Wyatt Campbell MD       SUBJECTIVE:  No acute events, remains Lab 02/20/20  1842 02/21/20  0627 02/22/20  0616   ALT 31 27 23   AST 48* 44* 39*   ALB 2.4* 2.1* 2.3*       Recent Labs   Lab 02/23/20  0723 02/23/20  1232 02/23/20  1706 02/23/20  2209 02/24/20  0727   PGLU 93 149* 158* 176* 144*       Meds:   Amitript of acute anemia. No other clear insults, urinalysis is bland without suggestion of GN. Creat stable today. Expect ongoing recovery to normal     #2. Anemia- hgb was 10 on 2/14 and was down approx 2g on admit.  eval ongoing    #3.   R arm edema- apparent

## 2020-02-24 NOTE — PROGRESS NOTES
[facility]  Progress Note    Alireza Miller Patient Status:  Observation    1931 MRN [id number]   [facility] 2NE-A Attending Anna Kim MD   Hosp Day # 2 PCP Tiffanie Crook MD         SUBJECTIVE:  Subjective:   Alireza Miller is a(n) 80 y rate and rhythm, S1 and S2 normal,     Abdomen:     Soft, non-tender, bowel sounds active all four quadrants,                Extremities:    no cyanosis, icterus \                                  Neurologic :  General weakness, CONCLUSION:  1. Cardiomegaly with interstitial alveolar opacities bilaterally likely representing edema. 2. Bilateral pleural effusions, left greater than right, with bilateral basilar atelectasis/infiltrates.  3. Thin linear radiopaque densities with i type [facility])    Atrial fibrillation, chronic (HCC)    Chronic kidney disease, unspecified CKD stage    ANTONIO (acute kidney injury) ([facility] [address]    Essential hypertension    Absolute anemia    Encephalopathy          Plan:  Continue present management,     Anemia, uns rule out acute neuro etiology per neurologist, and if negative may be discharged back to nursing            **Certification        PHYSICIAN Certification of Need for Inpatient Hospitalization - Initial Certification     Patient will require inpatient serv

## 2020-02-24 NOTE — PROGRESS NOTES
[id number] [facility] Neurology Progress Note    Mellisa Espino Patient Status:  Inpatient    1931 MRN [id number]   [facility] 2NE-A Attending Sanaz Contreras MD   Hosp Day # 2 PCP Wyatt Campbell MD     NEUROLOGY   Attending Addendum Davin BUN      7 - 18 mg/dL 40 (H) 48 (H)   CREATININE      0.55 - 1.02 mg/dL 1.45 (H) 1.62 (H)   BUN/CREAT Ratio      10.0 - 20.0 27.6 (H) 29.6 (H)       Current Meds:  • Amitriptyline HCl  20 mg Oral Nightly   • metoprolol Tartrate  50 mg Oral 2x Daily(Beta treatment - DNR    (   ) Acute neurologic changes    (   ) Others: New Rx    (   ) ICU >35 minutes total time   Available within moments call in hospital  PROCEDURE DONE    (   ) see notes    Subjective:   Alexis Kovacs is a(n) 80year old female with history of metoprolol Tartrate  50 mg Oral 2x Daily(Beta Blocker)   • gabapentin  300 mg Oral TID   • Levothyroxine Sodium  100 mcg Oral Before breakfast   • multivitamin  1 tablet Oral Daily   • Pantoprazole Sodium  40 mg Oral QAM AC   • Insulin Aspart Pen  1-5 Unit

## 2020-02-24 NOTE — PROCEDURES
[address] [facility] in [place]  in affiliation with [facility]  [address]  [place], [address] [place] [address]  [phone]  Fax [phone]  1931    Date of testin/24

## 2020-02-24 NOTE — PLAN OF CARE
Assumed care at 2100. Denies pain. Repositioned frequently for comfort. On 2L O2 per nasal cannula satting 100%. Afib on tele. RUE and BLE swelling noted. Elevated on pillows. Cronin intact from previous facility.    Mepilex to coccyx clean, dry, Aaron Balo edema, trend weights  Outcome: Progressing  Goal: Absence of cardiac arrhythmias or at baseline  Description  INTERVENTIONS:  - Continuous cardiac monitoring, monitor vital signs, obtain 12 lead EKG if indicated  - Evaluate effectiveness of antiarrhythmic including rhythm and repeat lab results as appropriate  - Fluid restriction as ordered  - Instruct patient on fluid and nutrition restrictions as appropriate  Outcome: Progressing  Goal: Hemodynamic stability and optimal renal function maintained  Descript as feeding, grooming, and bathing  - Educate and encourage patient/family in tolerated functional activity level and precautions during self-care     Outcome: Progressing

## 2020-02-24 NOTE — PROGRESS NOTES
[facility]  Cardiology Progress Note    Mellisa Espino Patient Status:  Inpatient    1931 MRN [id number]   [facility] 2NE-A Attending Sanaz Contreras MD   Hosp Day # 2 PCP Wyatt Campbell MD       Subjective:  Calm but confused.  Tena baldwin and HS   • insulin detemir  8 Units Subcutaneous Nightly   • folic acid  1 mg Oral Daily   • dilTIAZem HCl ER  240 mg Oral Daily   • fenofibrate micronized  67 mg Oral Daily with breakfast   • atorvastatin  20 mg Oral Nightly         Assessment:    · Anemi

## 2020-02-25 LAB
ANION GAP SERPL CALC-SCNC: 4 MMOL/L (ref 0–18)
BILIRUB UR QL STRIP.AUTO: NEGATIVE
BUN BLD-MCNC: 37 MG/DL (ref 7–18)
BUN/CREAT SERPL: 28.7 (ref 10–20)
CALCIUM BLD-MCNC: 8.4 MG/DL (ref 8.5–10.1)
CHLORIDE SERPL-SCNC: 108 MMOL/L (ref 98–112)
CO2 SERPL-SCNC: 28 MMOL/L (ref 21–32)
CREAT BLD-MCNC: 1.29 MG/DL (ref 0.55–1.02)
GLUCOSE BLD-MCNC: 108 MG/DL (ref 70–99)
GLUCOSE BLD-MCNC: 121 MG/DL (ref 70–99)
GLUCOSE BLD-MCNC: 134 MG/DL (ref 70–99)
GLUCOSE BLD-MCNC: 138 MG/DL (ref 70–99)
GLUCOSE BLD-MCNC: 156 MG/DL (ref 70–99)
GLUCOSE UR STRIP.AUTO-MCNC: NEGATIVE MG/DL
KETONES UR STRIP.AUTO-MCNC: NEGATIVE MG/DL
NITRITE UR QL STRIP.AUTO: POSITIVE
OSMOLALITY SERPL CALC.SUM OF ELEC: 299 MOSM/KG (ref 275–295)
PH UR STRIP.AUTO: 5 [PH] (ref 4.5–8)
POTASSIUM SERPL-SCNC: 3.8 MMOL/L (ref 3.5–5.1)
PROT UR STRIP.AUTO-MCNC: 30 MG/DL
SODIUM SERPL-SCNC: 140 MMOL/L (ref 136–145)
SP GR UR STRIP.AUTO: 1.01 (ref 1–1.03)
UROBILINOGEN UR STRIP.AUTO-MCNC: 2 MG/DL
WBC #/AREA URNS AUTO: >50 /HPF

## 2020-02-25 PROCEDURE — 99232 SBSQ HOSP IP/OBS MODERATE 35: CPT | Performed by: INTERNAL MEDICINE

## 2020-02-25 PROCEDURE — 99233 SBSQ HOSP IP/OBS HIGH 50: CPT | Performed by: OTHER

## 2020-02-25 RX ORDER — LEVOFLOXACIN 750 MG/1
750 TABLET ORAL
Status: DISCONTINUED | OUTPATIENT
Start: 2020-02-25 | End: 2020-02-26

## 2020-02-25 RX ORDER — LEVOFLOXACIN 500 MG/1
500 TABLET, FILM COATED ORAL DAILY
Qty: 6 TABLET | Refills: 0 | Status: SHIPPED | OUTPATIENT
Start: 2020-02-25 | End: 2020-02-26

## 2020-02-25 NOTE — PROGRESS NOTES
[facility]  Nephrology Progress Note    [address] [facility] Patient Status:  Observation    1931 MRN [id number]   [facility] 2NE-A Attending Shama Núñez MD   Hosp Day # 3 PCP Ok Cook MD       SUBJECTIVE:  Stable this AM, improved Labs   Lab 02/20/20  1842 02/21/20  0627 02/22/20  0616   ALT 31 27 23   AST 48* 44* 39*   ALB 2.4* 2.1* 2.3*       Recent Labs   Lab 02/24/20  0727 02/24/20  1222 02/24/20  1642 02/24/20  2108 02/25/20  0758   PGLU 144* 164* 179* 181* 121*       Meds:   A setting of acute anemia. No other clear insults, urinalysis is bland without suggestion of GN. Creat stable today. Expect ongoing recovery to normal     #2. Anemia- hgb was 10 on 2/14 and was down approx 2g on admit.  eval ongoing    #3.   R arm edema-

## 2020-02-25 NOTE — PROGRESS NOTES
[facility]acy Note:  Renal Adjustment for levofloxacin ([place])    Mercedes Arizmendi is a 80year old female who has been prescribed levofloxacin (LEVAQUIN) 500 mg every 24 hrs.   CrCl is estimated creatinine clearance is 24.9 mL/min (A) (based on SCr of 1.29 mg/dL

## 2020-02-25 NOTE — CM/SW NOTE
sw confirmed with Christian Vazquez at [facility] that they can accept pt back this evening.  RN aware

## 2020-02-25 NOTE — PROGRESS NOTES
[facility]  Progress Note    La Nena Quiroga Patient Status:  Observation    1931 MRN [id number]   [facility] 2NE-A Attending Miko Pham MD   Hosp Day # 3 PCP Valentín Swain MD         SUBJECTIVE:  Subjective:   La Nena Quiroga is a(n) 80 y 1.62* 1.45* 1.29* 1.36* 1.29*   CA 8.4* 8.6 8.4* 8.2* 8.4*   MG 2.0  --   --   --   --    * 75 84 136* 108*       Recent Labs   Lab 02/20/20  1842 02/21/20  0627 02/22/20  0616   ALT 31 27 23   AST 48* 44* 39*   ALB 2.4* 2.1* 2.3*       Recent Labs Sodium  100 mcg Oral Before breakfast   • multivitamin  1 tablet Oral Daily   • Pantoprazole Sodium  40 mg Oral QAM AC   • Insulin Aspart Pen  1-5 Units Subcutaneous TID CC and HS   • insulin detemir  8 Units Subcutaneous Nightly   • folic acid  1 mg Oral medical management, beta-blocker       Atrial fibrillation, chronic (HCC)  Rate control, discussed with cardiologist-flecainide and Eliquis on hold because of anemia, metoprolol to be continued  Overall–as per hematology oncology and cardiology–keep off El neurology pathology  See tests ordered,  Available and radiology reviewed  All consultant notes reviewed  Discussed with nursing on floor  dvt prophylaxis reviewed  PT and/or OT  Bird Batista MD

## 2020-02-25 NOTE — PLAN OF CARE
Received bedside report on this Pt., at 1935. Pt., awake in bed, A&Ox1, forgetful, pleasantly confused, calm and cooperative. Pt., used call light and called twice to use bed pan, had a small BM. Pt., turned and repositioned.  Pt., is in AFib per Tele meg

## 2020-02-25 NOTE — PROGRESS NOTES
[facility]  Cardiology Progress Note    Subjective:  No chest pain or shortness of breath.  AOx3    Objective:  BP (!) 110/94 (BP Location: Left arm)   Pulse 82   Temp 97.3 °F (36.3 °C) (Oral)   Resp 16   Ht 5' 3\" (1.6 m)   Wt 209 lb 10.5 oz (95.1 kg) AM    =======================================================  Patient seen and examined independently. Note reviewed and labs reviewed. Agree with above assessment and plan. Pleasantly confused and no complaint of chest pain or dyspnea.   CV exam, irreg

## 2020-02-25 NOTE — PROGRESS NOTES
Problem: Diabetes/Glucose Control  Goal: Glucose maintained within prescribed range  Description  INTERVENTIONS:  - Monitor Blood Glucose as ordered  - Assess for signs and symptoms of hyperglycemia and hypoglycemia  - Administer ordered medications to [facility] medications as ordered  - Initiate emergency measures for life threatening arrhythmias  - Monitor electrolytes and administer replacement therapy as ordered  Outcome: Progressing     Problem: RESPIRATORY - ADULT  Goal: Achieves optimal ventilation and oxyg Monitor labs and assess for signs and symptoms of volume excess or deficit  - Monitor intake, output and patient weight  - Monitor urine specific gravity, serum osmolarity and serum sodium as indicated or ordered  - Monitor response to interventions for pa Transfer today. Will continue POC.

## 2020-02-25 NOTE — CM/SW NOTE
ECIN updates sent to [facility].       Wilmar Mckinney LCSW

## 2020-02-25 NOTE — CM/SW NOTE
MSW placed [facility] on will call for today going to [facility]. PCS form placed on chart.   IF patient is discharged today, please call report to:    Giovanna Miller:  [facility]: [place] [id number]

## 2020-02-26 VITALS
HEART RATE: 73 BPM | WEIGHT: 206.38 LBS | SYSTOLIC BLOOD PRESSURE: 118 MMHG | HEIGHT: 63 IN | DIASTOLIC BLOOD PRESSURE: 79 MMHG | RESPIRATION RATE: 18 BRPM | BODY MASS INDEX: 36.57 KG/M2 | OXYGEN SATURATION: 99 % | TEMPERATURE: 98 F

## 2020-02-26 DIAGNOSIS — N18.9 ANEMIA DUE TO CHRONIC KIDNEY DISEASE, UNSPECIFIED CKD STAGE: Primary | ICD-10-CM

## 2020-02-26 DIAGNOSIS — D63.1 ANEMIA DUE TO CHRONIC KIDNEY DISEASE, UNSPECIFIED CKD STAGE: Primary | ICD-10-CM

## 2020-02-26 LAB
ANION GAP SERPL CALC-SCNC: 3 MMOL/L (ref 0–18)
BASOPHILS # BLD AUTO: 0.05 X10(3) UL (ref 0–0.2)
BASOPHILS NFR BLD AUTO: 0.8 %
BUN BLD-MCNC: 27 MG/DL (ref 7–18)
BUN/CREAT SERPL: 20.9 (ref 10–20)
CALCIUM BLD-MCNC: 8.4 MG/DL (ref 8.5–10.1)
CHLORIDE SERPL-SCNC: 107 MMOL/L (ref 98–112)
CO2 SERPL-SCNC: 31 MMOL/L (ref 21–32)
CREAT BLD-MCNC: 1.29 MG/DL (ref 0.55–1.02)
DEPRECATED RDW RBC AUTO: 74.9 FL (ref 35.1–46.3)
EOSINOPHIL # BLD AUTO: 0.14 X10(3) UL (ref 0–0.7)
EOSINOPHIL NFR BLD AUTO: 2.2 %
ERYTHROCYTE [DISTWIDTH] IN BLOOD BY AUTOMATED COUNT: 18.6 % (ref 11–15)
GLUCOSE BLD-MCNC: 126 MG/DL (ref 70–99)
GLUCOSE BLD-MCNC: 139 MG/DL (ref 70–99)
GLUCOSE BLD-MCNC: 140 MG/DL (ref 70–99)
HCT VFR BLD AUTO: 26.2 % (ref 35–48)
HGB BLD-MCNC: 8 G/DL (ref 12–16)
IMM GRANULOCYTES # BLD AUTO: 0.14 X10(3) UL (ref 0–1)
IMM GRANULOCYTES NFR BLD: 2.2 %
LYMPHOCYTES # BLD AUTO: 0.55 X10(3) UL (ref 1–4)
LYMPHOCYTES NFR BLD AUTO: 8.7 %
MCH RBC QN AUTO: 33.3 PG (ref 26–34)
MCHC RBC AUTO-ENTMCNC: 30.5 G/DL (ref 31–37)
MCV RBC AUTO: 109.2 FL (ref 80–100)
MONOCYTES # BLD AUTO: 0.72 X10(3) UL (ref 0.1–1)
MONOCYTES NFR BLD AUTO: 11.4 %
NEUTROPHILS # BLD AUTO: 4.7 X10 (3) UL (ref 1.5–7.7)
NEUTROPHILS # BLD AUTO: 4.7 X10(3) UL (ref 1.5–7.7)
NEUTROPHILS NFR BLD AUTO: 74.7 %
OSMOLALITY SERPL CALC.SUM OF ELEC: 299 MOSM/KG (ref 275–295)
PLATELET # BLD AUTO: 265 10(3)UL (ref 150–450)
POTASSIUM SERPL-SCNC: 4.1 MMOL/L (ref 3.5–5.1)
RBC # BLD AUTO: 2.4 X10(6)UL (ref 3.8–5.3)
SODIUM SERPL-SCNC: 141 MMOL/L (ref 136–145)
WBC # BLD AUTO: 6.3 X10(3) UL (ref 4–11)

## 2020-02-26 PROCEDURE — 99232 SBSQ HOSP IP/OBS MODERATE 35: CPT | Performed by: INTERNAL MEDICINE

## 2020-02-26 RX ORDER — LEVOFLOXACIN 750 MG/1
750 TABLET ORAL
Qty: 3 TABLET | Refills: 0 | Status: SHIPPED | OUTPATIENT
Start: 2020-02-27 | End: 2020-02-26

## 2020-02-26 RX ORDER — LEVOFLOXACIN 750 MG/1
750 TABLET ORAL
Qty: 3 TABLET | Refills: 0 | Status: SHIPPED | OUTPATIENT
Start: 2020-02-27

## 2020-02-26 NOTE — PLAN OF CARE
Alert to self, 98% on 2L, VSS, afib on tele, no complaints of chest pain or SOB, tylenol given for back pain, up in chair, pivot to chair x2, levaquin for UTI, plan to discharge back to [facility] today, safety precautions in place; bed in lowest position strengthening/mobility  - Encourage toileting schedule  Outcome: Progressing     Problem: CARDIOVASCULAR - ADULT  Goal: Maintains optimal cardiac output and hemodynamic stability  Description  INTERVENTIONS:  - Monitor vital signs, rhythm, and trends  - Mo prescribed range  Description  INTERVENTIONS:  - Monitor Blood Glucose as ordered  - Assess for signs and symptoms of hyperglycemia and hypoglycemia  - Administer ordered medications to maintain glucose within target range  - Assess barriers to adequate nu Impaired Functional Mobility  Goal: Achieve highest/safest level of mobility/gait  Description  Interventions:  - Assess patient's functional ability and stability  - Promote increasing activity/tolerance for mobility and gait  - Educate and engage patient

## 2020-02-26 NOTE — PLAN OF CARE
Alert to self, 98% on 2L, VSS, afib on tele, no complaints of chest pain or SOB, tylenol given for back pain, up in chair, pivot to chair x2, levaquin for UTI, plan to discharge back to [facility] today, safety precautions in place; bed in lowest position Problem: CARDIOVASCULAR - ADULT  Goal: Maintains optimal cardiac output and hemodynamic stability  Description  INTERVENTIONS:  - Monitor vital signs, rhythm, and trends  - Monitor for bleeding, hypotension and signs of decreased cardiac output  - Evalua ordered  - Assess for signs and symptoms of hyperglycemia and hypoglycemia  - Administer ordered medications to maintain glucose within target range  - Assess barriers to adequate nutritional intake and initiate nutrition consult as needed  - Instruct silva mobility/gait  Description  Interventions:  - Assess patient's functional ability and stability  - Promote increasing activity/tolerance for mobility and gait  - Educate and engage patient/family in tolerated activity level and precautions  -2 person niharika

## 2020-02-26 NOTE — CM/SW NOTE
JR left message for [facility] to comfirm patient can return to facility today. Updates sent via [facility].     Becky YTLER, 02/26/20, 1:48 PM

## 2020-02-26 NOTE — PROGRESS NOTES
I was called by the nurse this afternoon regarding urine analysis.  Since urinalysis was positive for nitrite, although urinalysis -5 days ago was negative, and since patient with altered mental status, and  Levaquin started

## 2020-02-26 NOTE — PROGRESS NOTES
[facility]  Progress Note    Kayy Song Patient Status:  Observation    1931 MRN [id number]   [facility] 2NE-A Attending Susan Do MD   Hosp Day # 4 PCP Lennox Ahr, MD         SUBJECTIVE:  Subjective:   Kayy Song is a(n) 80 y Intake 50 ml   Output 350 ml   Net -300 ml       Exam  General Appearance:    Alert, cooperative, no distress, appears stated age   Head:    Normocephalic,  atraumatic   Neck:   Supple, symmetrical, trachea midline,        thyroid:      no JVD   Lungs: PROCEDURE:  XR CHEST AP PORTABLE  (CPT=71045)  TECHNIQUE:  AP chest radiograph was obtained. COMPARISON:  None.   INDICATIONS:  Low Hemoglobin  PATIENT STATED HISTORY: (As transcribed by Technologist)  Pt to ed from [facility] for c/o low hemag glucose **OR** Glucose-Vitamin C **OR** dextrose **OR** glucose **OR** Glucose-Vitamin C, dilTIAZem HCl, acetaminophen, diphenhydrAMINE HCl, hydrALAzine HCl, ondansetron HCl       Assessment/Plan:   Patient Active Problem List:     Acute on chronic congest Because of acute on chronic kidney failure, diuretics on hold, renal follow-up     IDDM with complication–hold glipizide due to renal failure, start Levemir 8 units and NovoLog sliding scale     GERD–pantoprazole     DVT prophylaxis–Eliquis which is curren

## 2020-02-26 NOTE — PLAN OF CARE
ALERT AND ORIENTED TO SELF AND  ON TELE MONITOR HR 70'S AFIB. O2 AT 2L NC IN USED. AGUILAR CATH IN PLACED. DENIES ANY PAIN. ALL NEEDS ATTENDED AND WILL CONTINUE TO MONITOR. CALL LIGHT WITHIN REACH.   Problem: Diabetes/Glucose Control  Goal: Glucose maintai signs of decreased cardiac output  - Evaluate effectiveness of vasoactive medications to optimize hemodynamic stability  - Monitor arterial and/or venous puncture sites for bleeding and/or hematoma  - Assess quality of pulses, skin color and temperature  - consult as needed  - Instruct patient on self management of diabetes  Outcome: Progressing  Goal: Electrolytes maintained within normal limits  Description  INTERVENTIONS:  - Monitor labs and rhythm and assess patient for signs and symptoms of electrolyte precautions  -2 person assist with use of RW for transfers.     Outcome: Progressing     Problem: Impaired Activities of Daily Living  Goal: Achieve highest/safest level of independence in self care  Description  Interventions:  - Assess ability and encoura

## 2020-02-26 NOTE — DISCHARGE SUMMARY
[facility]  Discharge Summary    Francesca Santos Patient Status:  Inpatient    1931 MRN [id number]   [facility] 2NE-A Attending Maura Sanders MD   [facility] Day # 4 PCP Mabel Galvan MD     Date of Admission: 2020    Date of Discharge PROCEDURE:  CT BRAIN OR HEAD (74069)  COMPARISON:  None. INDICATIONS:  AMS  TECHNIQUE:  Noncontrast CT scanning is performed through the brain. Dose reduction techniques were used.  Dose information is transmitted to the [facility]) CONCLUSION:  1. Cardiomegaly with interstitial alveolar opacities bilaterally likely representing edema. 2. Bilateral pleural effusions, left greater than right, with bilateral basilar atelectasis/infiltrates.  3. Thin linear radiopaque densities with in kervin   Bacteria Urine       2+         Bacteria Urine       SQUAM EPI CELLS UR       None Seen         SQUAM EPI CELLS UR       RENAL TUBULAR EPITHELIAL CELLS       None Seen         RENAL TUBULAR EPITHELIAL CELLS       TRANSITIONAL EPI CELLS       None Seen   BUN 48* 40* 36* 35* 37* 27*   CREATSERUM 1.62* 1.45* 1.29* 1.36* 1.29* 1.29*   CA 8.4* 8.6 8.4* 8.2* 8.4* 8.4*   MG 2.0  --   --   --   --   --    * 75 84 136* 108* 140*               Recent Labs   Lab 02/20/20  1842 02/21/20  9065 02/22/20  1266 • Amitriptyline HCl  20 mg Oral Nightly   • metoprolol Tartrate  50 mg Oral 2x Daily(Beta Blocker)   • gabapentin  300 mg Oral TID   • Levothyroxine Sodium  100 mcg Oral Before breakfast   • multivitamin  1 tablet Oral Daily   • Pantoprazole Sodium  40 mg   Acute on chronic congestive heart failure (HCC)  Fluid status stable at this point.       Acute on chronic congestive heart failure, unspecified heart failure type (HCC)  Monitor weights, fluid balance, medical management, beta-blocker     Breanna Arias Based on patients current state of illness, I anticipate that, after discharge, patient will require TBD.        WEAKNESS- PT OT EVAL AND TREAT  DISPOSITION PER      Confusion–Discussed with cardiology APN and neurology APN on floor and will r Take 240 mg by mouth daily. Multiple Vitamin Oral Tab  Take 1 tablet by mouth daily. Cranberry 200 MG Oral Cap  Take 1 tablet by mouth daily. Fenofibrate 145 MG Oral Tab  Take 160 mg by mouth daily.     gabapentin 300 MG Oral Cap  Take 300 mg by mo

## 2020-02-26 NOTE — PROGRESS NOTES
[facility]  Nephrology Progress Note    Roberto Carlos Mckinley Patient Status:  Observation    1931 MRN [id number]   [facility] 2NE-A Attending Jon Cook MD   Hosp Day # 4 PCP Tramaine Guy MD       SUBJECTIVE:  Stable this AM      Physi --   --   --   --   --    * 75 84 136* 108* 140*       Recent Labs   Lab 02/20/20  1842 02/21/20  0627 02/22/20  0616   ALT 31 27 23   AST 48* 44* 39*   ALB 2.4* 2.1* 2.3*       Recent Labs   Lab 02/25/20  0758 02/25/20  1153 02/25/20  1700 02/25/2 Nightly          Impression/Plan:      #1. ANTONIO- creat one week prior to admit was essentially normal and ANTONIO most c/w decr renal perfusion with afib/RVR in setting of acute anemia. No other clear insults, urinalysis is bland without suggestion of GN.   Cr

## 2020-03-25 ENCOUNTER — APPOINTMENT (OUTPATIENT)
Dept: HEMATOLOGY/ONCOLOGY | Facility: HOSPITAL | Age: 85
End: 2020-03-25
Attending: SPECIALIST
Payer: MEDICARE

## 2020-04-06 PROBLEM — R14.0 BLOATING: Status: ACTIVE | Noted: 2020-04-06

## 2020-04-06 PROBLEM — R11.0 NAUSEA: Status: ACTIVE | Noted: 2020-04-06

## 2021-01-12 ENCOUNTER — EXTERNAL RECORD (OUTPATIENT)
Dept: NEUROLOGY | Age: 86
End: 2021-01-12

## 2021-02-03 DIAGNOSIS — Z23 NEED FOR VACCINATION: ICD-10-CM

## 2024-09-13 NOTE — PROGRESS NOTES
[id number] [facility] Progress Note    Brian Grace Patient Status:  Inpatient    1931 MRN [id number]   [facility] 2NE-A Attending Fernando Langley MD   Hosp Day # 3 PCP Juancho Khoury MD     NEUROLOGY   Attending Addendum Davin mg/dL    Sodium 140 136 - 145 mmol/L    Potassium 3.8 3.5 - 5.1 mmol/L    Chloride 108 98 - 112 mmol/L    CO2 28.0 21.0 - 32.0 mmol/L    Anion Gap 4 0 - 18 mmol/L    BUN 37 (H) 7 - 18 mg/dL    Creatinine 1.29 (H) 0.55 - 1.02 mg/dL    BUN/CREA Ratio 28.7 (H with history of atrial fib, hypertension, DM, CKD and PM who was admitted for abnormal labs, worsening anemia and acute on chronic renal failure.  Neurology is following for confusion      Current complaints: no acute events over night, she denies headache AC   • Insulin Aspart Pen  1-5 Units Subcutaneous TID CC and HS   • insulin detemir  8 Units Subcutaneous Nightly   • folic acid  1 mg Oral Daily   • dilTIAZem HCl ER  240 mg Oral Daily   • fenofibrate micronized  67 mg Oral Daily with breakfast   • atorva Yes

## 2025-07-05 NOTE — CM/SW NOTE
Patient failed inpatient criteria. Second level of review completed and supports observation. UR committee in agreement. Discussed with Dr. Per Nix who approves observation status. MOON given to the patient and order written. no

## (undated) NOTE — IP AVS SNAPSHOT
Patient Demographics     Address  [place] [id number] Phone  [phone] [facility])      Emergency Contact(s)     Name Relation Home Work Mobile    RUTH CURRAN Daughter   [phone]      Allergies as of 2/26/2020  Review status gabapentin 300 MG Caps  Commonly known as:  NEURONTIN  Next dose due:  TONIGHT  Notes to patient:  2/26/20 5pm      Take 300 mg by mouth 3 (three) times daily.           insulin detemir 100 UNIT/ML Sopn  Commonly known as:  LEVEMIR  Next dose due:  T [id number] Levothyroxine Sodium (SYNTHROID) tab 100 mcg 02/26/20 0541 Given      [id number] Metoprolol Tartrate (LOPRESSOR) tab 50 mg 02/25/20 1720 Given      [id number] Metoprolol Tartrate (LOPRESSOR) tab 50 mg 02/26/20 0541 Given      [id number] Pantoprazol Ordering provider:  Rodney Kohler MD  02/25/20 2300 Resulting lab:  [facility]   Narrative: The following orders were created for panel order CBC WITH DIFFERENTIAL WITH PLATELET.   Procedure                               Abnormality         Status Spec Gravity 1.015 1.001 - 1.030 — [facility]   Glucose Urine Negative Negative mg/dl — [facility]   Bilirubin Urine Negative Negative — [facility]   Ketones Urine Negative Negative mg/dL — [facility]   Blood Urine Small Negative A [facility]   pH Urine 5. Location [address] [place] [address] 2NE-A Attending Birdie Felton MD   [facility] Day # 1 PCP Jeremie Miramontes MD     Date:  2/21/2020  Date of Admission:  2/20/2020    History provided by:patient/ ER MD/NOTES/ NH NOTES  Chief Complaint:   Patient presents with:  Abnormal omeprazole 20 MG Oral Capsule Delayed Release, Take 40 mg by mouth every morning before breakfast.  simvastatin 40 MG Oral Tab, Take 40 mg by mouth nightly.   Levothyroxine Sodium 100 MCG Oral Tab, Take 100 mcg by mouth before breakfast.  dilTIAZem HCl ER 2 Heart:    Regular rate and rhythm, S1 and S2 normal,    Abdomen:     Soft, non-tender, bowel sounds active all four quadrants,                Extremities:    no cyanosis, icterus        Neurologic :                                  General weakness, no foc [facility] for c/o low hemaglobin. PT offers no  complaints     FINDINGS:  Left-sided pacemaker. Cardiomegaly. Interstitial and alveolar opacities bilaterally.   Bilateral pleural effusions, left greater than right, with bilateral basilar atel Because of acute on chronic kidney failure, diuretics on hold, renal follow-up    IDDM with complication–hold glipizide due to renal failure, start Levemir 8 units and NovoLog sliding scale    GERD–pantoprazole    DVT prophylaxis–Eliquis which is currently Chronic kidney disease, unspecified CKD stage [N18.9]  Acute on chronic congestive heart failure, unspecified heart failure type ([facility] [I50.9]      Reason for Consultation:  AMS    History of Present Illness:  Blaine Benson is a a(n) 80year old female with his PRN **OR** dextrose 50 % injection 50 mL, 50 mL, Intravenous, Q15 Min PRN **OR** glucose (DEX4) oral liquid 30 g, 30 g, Oral, Q15 Min PRN **OR** Glucose-Vitamin C (DEX-4) chewable tab 8 tablet, 8 tablet, Oral, Q15 Min PRN  •  Insulin Aspart Pen (NOVOLOG) 1 Sensation to light touch is intact bilaterally. Motor:  No focal arm or leg weakness noted, mild generalized weakness. Finger-to-nose coordination is intact. Gait deferred.     Diagnostic Data:   Lab Results   Component Value Date     02/23/2020 Signed    :  Josh Ansari MD (Physician)       [facility]. 1]    Barrie Olea[SK.2] Patient Status:  Inpatient    1931 MRN [id number]   [facility] 2NE-A Attending Josh Ansari MD   Hosp Day #[SK.1] 4[S cells are unremarkable. Visualized portions of the orbits are unremarkable. IMPRESSION: Global parenchymal volume loss is noted. Sequelae of chronic small vessel ischemic disease is noted.  No evidence of intracranial hemorrhage or extra-axial fluid collec No chest pain, improved shortness of breath,  Improving cough, congestion,   No fever  MRI pending and pacemaker check per cardiology  Doing okay overall  Amitriptyline was restarted per neurology                           Immature Granulocyte %           General Appearance:    Alert, cooperative, no distress, appears stated age   Head:    Normocephalic,  atraumatic   Neck:   Supple, symmetrical, trachea midline,        thyroid:      no JVD   Lungs:    deCreased breath sounds at bases bilaterally, respirati PATIENT STATED HISTORY: (As transcribed by Technologist)  Pt to ed from [facility] for c/o low hemaglobin. PT offers no  complaints     FINDINGS:  Left-sided pacemaker. Cardiomegaly. Interstitial and alveolar opacities bilaterally.   Yoni Briceno Acute on chronic congestive heart failure (HCC)     Acute on chronic congestive heart failure, unspecified heart failure type (HCC)     Atrial fibrillation, chronic (HCC)     Anemia, unspecified type     Chronic kidney disease, unspecified CKD stage DVT prophylaxis–Eliquis which is currently on hold will use SCDs–off Eliquis per hematology and cardiology recommendation due to anemia, bleed risk     Hypertension–metoprolol, Cardizem     Neuropathy–gabapentin     Dyslipidemia–statin and fenofibrate    START taking these medications    levofloxacin 500 MG Oral Tab  Take 1 tablet (500 mg total) by mouth daily. Qty: 6 tablet Refills: 0    atorvastatin 20 MG Oral Tab  Take 1 tablet (20 mg total) by mouth nightly.   Qty: 30 tablet Refills: 0    folic acid 1 Imaging Results (HF patients)    Chest X-Ray Results (HF patients only)    No exam resulted this encounter. 2D Echocardiogram Results (HF patients only)    No exam resulted this encounter.       Cath Angiogram Results (HF Patients only) • Hyperlipidemia    • Intervertebral disc disease    • Malignant melanoma ([facility]     SKIN    • Osteoporosis    • Scoliosis    • Weakness[CK.2]        Past Surgical History[CK. 1]  History reviewed. No pertinent surgical history. [CK.2]    SUBJECTIVE  Pt c/o n Standardized Score (AM-PAC Scale): 38.1   CMS Modifier (G-Code): CL[CK. 2]    FUNCTIONAL ABILITY STATUS  Gait Assessment[CK. 1]   Gait Assistance: Not tested(refused)  Distance (ft): 0  Assistive Device: Rolling walker  Pattern: Comment(n/a)  Stoop/Curb Assi motivated to do something. [CK.2]     Activity recommendations =[CK. 1] cont up to b/s chair[CK. 2]. Pt agreeable to these activity recommendations. [CK.1]      Patient End of Session: Up in chair;Needs met;Call light within reach;RN aware of session/findings CK.2 - Krafft-Mealle, [place] A, PT on 2/24/2020  2:05 PM  CK. 3 - Krafft-Mealle, [place] A, PT on 2/24/2020  2:16 PM  CK. 4 - Krafft-MealleCherelleyl A, PT on 2/24/2020  2:15 PM  CK. 5 - Krafft-MealleAsad, PT on 2/24/2020  1:56 PM               Physical Th Pt c/o nausea every time I sit her up. [CK.1] Resolves when I lay her down, or when she is occupied eating. [CK.2]     Patient’s self-stated goal is go back to bed. Encouraged pt to stay up in b/s chair for lunch.      OBJECTIVE[CK.1]  Precautions: Hard of he Stoop/Curb Assistance: Not tested  Comment : n/a[CK. 2]      Skilled Therapy Provided:     During eval and treat, pt on 2L O2 with >92% saturation.      Bed Mobility:  Rolling right = min assist reclined in b/s chair   Rolling left = NT    Supine to sit = NT aware of session/findings; All patient questions and concerns addressed; Alarm set; Family present[CK. 2]    ASSESSMENT[CK.1]     Pt with on/off participation and is confused.  Pt is able to progress but demonstrates it only when she is motivated to do somethin No notes of this type exist for this encounter. Video Swallow Study Notes    No notes of this type exist for this encounter.         SLP Note - SLP Notes      SLP Note signed by Andrey Nelson at 2/21/2020  1:38 PM  Version 1 of 1    Author:  Chase Peterson, Problem List  Principal Problem:    Anemia, unspecified type  Active Problems:    Acute on chronic congestive heart failure (HCC)    Acute on chronic congestive heart failure, unspecified heart failure type (HCC)    Atrial fibrillation, chronic ([facility]    Ch Respiratory Status: Unlabored; Supplemental O2;Nasal cannula  Consistencies Trialed: Thin liquids;Puree; Soft solid  Method of Presentation: Staff/Clinician assistance;Straw;Single sips  Patient Positioning: Upright;Midline(in bed)    Oral Phase of Swallow:

## (undated) NOTE — IP AVS SNAPSHOT
[address]            (For Outpatient Use Only) Initial Admit Date: 2/20/2020   Inpt/Obs Admit Date: Inpt: 2/20/20 / Obs: 02/21/20   Discharge Date:    Leia Later:  [de-identified]   MRN: [de-identified]   CSN: [id number]   CEID: [id number] Hospital Account Financial Class: Medicare    February 26, 2020